# Patient Record
Sex: FEMALE | Race: WHITE | NOT HISPANIC OR LATINO | URBAN - METROPOLITAN AREA
[De-identification: names, ages, dates, MRNs, and addresses within clinical notes are randomized per-mention and may not be internally consistent; named-entity substitution may affect disease eponyms.]

---

## 2018-05-10 ENCOUNTER — OFFICE (AMBULATORY)
Dept: URBAN - METROPOLITAN AREA CLINIC 75 | Facility: CLINIC | Age: 78
End: 2018-05-10

## 2018-05-10 VITALS
WEIGHT: 156 LBS | SYSTOLIC BLOOD PRESSURE: 122 MMHG | HEIGHT: 64 IN | DIASTOLIC BLOOD PRESSURE: 74 MMHG | HEART RATE: 66 BPM

## 2018-05-10 DIAGNOSIS — K59.00 CONSTIPATION, UNSPECIFIED: ICD-10-CM

## 2018-05-10 DIAGNOSIS — K64.8 OTHER HEMORRHOIDS: ICD-10-CM

## 2018-05-10 DIAGNOSIS — K57.30 DIVERTICULOSIS OF LARGE INTESTINE WITHOUT PERFORATION OR ABS: ICD-10-CM

## 2018-05-10 DIAGNOSIS — K58.9 IRRITABLE BOWEL SYNDROME WITHOUT DIARRHEA: ICD-10-CM

## 2018-05-10 DIAGNOSIS — K22.4 DYSKINESIA OF ESOPHAGUS: ICD-10-CM

## 2018-05-10 DIAGNOSIS — R94.5 ABNORMAL RESULTS OF LIVER FUNCTION STUDIES: ICD-10-CM

## 2018-05-10 DIAGNOSIS — K29.50 UNSPECIFIED CHRONIC GASTRITIS WITHOUT BLEEDING: ICD-10-CM

## 2018-05-10 DIAGNOSIS — K44.9 DIAPHRAGMATIC HERNIA WITHOUT OBSTRUCTION OR GANGRENE: ICD-10-CM

## 2018-05-10 DIAGNOSIS — R93.3 ABNORMAL FINDINGS ON DIAGNOSTIC IMAGING OF OTHER PARTS OF DI: ICD-10-CM

## 2018-05-10 DIAGNOSIS — R13.10 DYSPHAGIA, UNSPECIFIED: ICD-10-CM

## 2018-05-10 DIAGNOSIS — K31.9 DISEASE OF STOMACH AND DUODENUM, UNSPECIFIED: ICD-10-CM

## 2018-05-10 DIAGNOSIS — K30 FUNCTIONAL DYSPEPSIA: ICD-10-CM

## 2018-05-10 PROCEDURE — 99214 OFFICE O/P EST MOD 30 MIN: CPT | Performed by: INTERNAL MEDICINE

## 2018-05-10 NOTE — SERVICEHPINOTES
Ms. Carlos is here for followup. She still has issues with esophageal dysmotility, that's related to her hiatal hernia and as a result she has some compression of the esophagus. She does not have a mechanical obstruction or stricture. Check she says from a reflux standpoint she's been doing well, she has occasional situational reflux but no chronic issues. She does tell when she lies down she'll sometimes have belching. She knows to he eats small meals she knows not to eat late. She was wanted to know what she could use as needed for upper GI symptoms and suggested Gaviscon. If she is having chronic symptoms she would benefit from a PPI or H2 blocker but she does not have frequent symptoms.She does have a she's changed her diet and thanks for that reason she is lost about 5-7 pounds. She's had a slow weight loss and again it seems to be more or less diarrhea related. However she does tell me she's been having nausea. The nausea is only been an issue for about 3 weeks. She'll be queasy and nauseous, there is no emesis. Recent labs have been unremarkable. She says she's had some decrease in appetite from the nausea. I told her Gaviscon may also help the nausea symptoms.She is also complaining of constipation. She'll go to the bathroom but is only small amounts of what she describes as like raisins. If she takes a stool softener stools will then be loose. We talked about try MiraLax and she has actually used a low dose of MiraLax last few days and says it seems to be helping. She also wanted about a glycerin suppository and she can use that when necessary. We also talked about probiotic. She took a probiotic but only for about a week and thought it made her feel worse. She does have a history of polyps. She is up-to-date in terms of colonoscopy, did colonoscopy on her in August 2016 per also do an upper endoscopy on her at that time. She is in no distress. She does not look acutely ill. Otherwise there is no change in her past medical pressors will history Italo does not smoke or drink.

## 2018-11-12 ENCOUNTER — HOSPITAL ENCOUNTER (OUTPATIENT)
Dept: OTHER | Facility: HOSPITAL | Age: 78
Setting detail: SPECIMEN
Discharge: HOME | End: 2018-11-12
Attending: UROLOGY | Admitting: UROLOGY

## 2018-11-13 LAB — SPECIMEN SOURCE: NORMAL

## 2019-02-05 ENCOUNTER — OFFICE (AMBULATORY)
Dept: URBAN - METROPOLITAN AREA CLINIC 75 | Facility: CLINIC | Age: 79
End: 2019-02-05

## 2019-02-05 VITALS
HEIGHT: 64 IN | SYSTOLIC BLOOD PRESSURE: 134 MMHG | DIASTOLIC BLOOD PRESSURE: 70 MMHG | WEIGHT: 138 LBS | HEART RATE: 72 BPM

## 2019-02-05 DIAGNOSIS — K21.9 GASTRO-ESOPHAGEAL REFLUX DISEASE WITHOUT ESOPHAGITIS: ICD-10-CM

## 2019-02-05 DIAGNOSIS — K59.00 CONSTIPATION, UNSPECIFIED: ICD-10-CM

## 2019-02-05 DIAGNOSIS — R13.10 DYSPHAGIA, UNSPECIFIED: ICD-10-CM

## 2019-02-05 DIAGNOSIS — K58.1 IRRITABLE BOWEL SYNDROME WITH CONSTIPATION: ICD-10-CM

## 2019-02-05 DIAGNOSIS — K57.30 DIVERTICULOSIS OF LARGE INTESTINE WITHOUT PERFORATION OR ABS: ICD-10-CM

## 2019-02-05 DIAGNOSIS — K31.9 DISEASE OF STOMACH AND DUODENUM, UNSPECIFIED: ICD-10-CM

## 2019-02-05 DIAGNOSIS — K22.2 ESOPHAGEAL OBSTRUCTION: ICD-10-CM

## 2019-02-05 DIAGNOSIS — R11.0 NAUSEA: ICD-10-CM

## 2019-02-05 DIAGNOSIS — K44.9 DIAPHRAGMATIC HERNIA WITHOUT OBSTRUCTION OR GANGRENE: ICD-10-CM

## 2019-02-05 DIAGNOSIS — K64.8 OTHER HEMORRHOIDS: ICD-10-CM

## 2019-02-05 DIAGNOSIS — K22.4 DYSKINESIA OF ESOPHAGUS: ICD-10-CM

## 2019-02-05 DIAGNOSIS — Z86.010 PERSONAL HISTORY OF COLONIC POLYPS: ICD-10-CM

## 2019-02-05 PROCEDURE — 99213 OFFICE O/P EST LOW 20 MIN: CPT | Performed by: NURSE PRACTITIONER

## 2021-09-30 ENCOUNTER — OFFICE (AMBULATORY)
Dept: URBAN - METROPOLITAN AREA CLINIC 75 | Facility: CLINIC | Age: 81
End: 2021-09-30

## 2021-09-30 VITALS
WEIGHT: 129 LBS | DIASTOLIC BLOOD PRESSURE: 58 MMHG | OXYGEN SATURATION: 92 % | TEMPERATURE: 98.1 F | HEIGHT: 64 IN | SYSTOLIC BLOOD PRESSURE: 124 MMHG | HEART RATE: 63 BPM

## 2021-09-30 DIAGNOSIS — K59.00 CONSTIPATION, UNSPECIFIED: ICD-10-CM

## 2021-09-30 DIAGNOSIS — K21.9 GASTRO-ESOPHAGEAL REFLUX DISEASE WITHOUT ESOPHAGITIS: ICD-10-CM

## 2021-09-30 DIAGNOSIS — Z86.010 PERSONAL HISTORY OF COLONIC POLYPS: ICD-10-CM

## 2021-09-30 DIAGNOSIS — K44.9 DIAPHRAGMATIC HERNIA WITHOUT OBSTRUCTION OR GANGRENE: ICD-10-CM

## 2021-09-30 PROCEDURE — 99214 OFFICE O/P EST MOD 30 MIN: CPT | Performed by: INTERNAL MEDICINE

## 2021-09-30 NOTE — SERVICEHPINOTES
Ms. Carlos is here for follow-up.  I have not seen her in some time.  The last time she was here she saw my nurse practitioner.  She has a history of polyps and is due for colonoscopy.  She does have problems with constipation which is not new.  She uses MiraLAX and a stool softener.  There is no bleeding.  There is no lower abdominal pain.  Family history is negative for polyps or colon cancer.
lilian Argueta also has a history of reflux.  She has a history of a large hiatal hernia and stricture and she does have trouble swallowing at times.  Since I saw her she has lost weight which she says is due to the fact she's had problems with UTIs and is treated lithotripsy and stenting for kidney stones.  She says her weight has leveled off.  She says she has a good appetite.  She does tell me that she was put on potassium supplements and which she took it at night that did cause some burning and belching.  She does not smoke or drink.  She is in no distress.  She does not look acutely ill.

## 2021-12-07 VITALS
SYSTOLIC BLOOD PRESSURE: 113 MMHG | HEART RATE: 70 BPM | SYSTOLIC BLOOD PRESSURE: 110 MMHG | TEMPERATURE: 98.1 F | HEIGHT: 64 IN | DIASTOLIC BLOOD PRESSURE: 57 MMHG | HEART RATE: 67 BPM | RESPIRATION RATE: 16 BRPM | TEMPERATURE: 97.3 F | OXYGEN SATURATION: 100 % | SYSTOLIC BLOOD PRESSURE: 126 MMHG | DIASTOLIC BLOOD PRESSURE: 71 MMHG | SYSTOLIC BLOOD PRESSURE: 144 MMHG | DIASTOLIC BLOOD PRESSURE: 41 MMHG | SYSTOLIC BLOOD PRESSURE: 141 MMHG | HEART RATE: 65 BPM | DIASTOLIC BLOOD PRESSURE: 47 MMHG | DIASTOLIC BLOOD PRESSURE: 58 MMHG | OXYGEN SATURATION: 96 % | HEART RATE: 72 BPM | SYSTOLIC BLOOD PRESSURE: 112 MMHG | RESPIRATION RATE: 17 BRPM | SYSTOLIC BLOOD PRESSURE: 107 MMHG | RESPIRATION RATE: 14 BRPM | OXYGEN SATURATION: 98 % | DIASTOLIC BLOOD PRESSURE: 66 MMHG | DIASTOLIC BLOOD PRESSURE: 63 MMHG | DIASTOLIC BLOOD PRESSURE: 52 MMHG | SYSTOLIC BLOOD PRESSURE: 132 MMHG | RESPIRATION RATE: 12 BRPM | SYSTOLIC BLOOD PRESSURE: 162 MMHG | HEART RATE: 64 BPM | OXYGEN SATURATION: 99 % | HEART RATE: 63 BPM | SYSTOLIC BLOOD PRESSURE: 131 MMHG | SYSTOLIC BLOOD PRESSURE: 153 MMHG | WEIGHT: 130 LBS | RESPIRATION RATE: 13 BRPM | DIASTOLIC BLOOD PRESSURE: 64 MMHG | HEART RATE: 66 BPM | DIASTOLIC BLOOD PRESSURE: 68 MMHG | SYSTOLIC BLOOD PRESSURE: 101 MMHG | HEART RATE: 68 BPM | DIASTOLIC BLOOD PRESSURE: 44 MMHG | RESPIRATION RATE: 18 BRPM

## 2021-12-08 ENCOUNTER — OFFICE (AMBULATORY)
Dept: URBAN - METROPOLITAN AREA PATHOLOGY 4 | Facility: PATHOLOGY | Age: 81
End: 2021-12-08

## 2021-12-08 ENCOUNTER — AMBULATORY SURGICAL CENTER (AMBULATORY)
Dept: URBAN - METROPOLITAN AREA SURGERY 17 | Facility: SURGERY | Age: 81
End: 2021-12-08
Payer: MEDICARE

## 2021-12-08 DIAGNOSIS — Z86.010 PERSONAL HISTORY OF COLONIC POLYPS: ICD-10-CM

## 2021-12-08 DIAGNOSIS — K22.2 ESOPHAGEAL OBSTRUCTION: ICD-10-CM

## 2021-12-08 DIAGNOSIS — B96.81 HELICOBACTER PYLORI [H. PYLORI] AS THE CAUSE OF DISEASES CLA: ICD-10-CM

## 2021-12-08 DIAGNOSIS — K57.30 DIVERTICULOSIS OF LARGE INTESTINE WITHOUT PERFORATION OR ABS: ICD-10-CM

## 2021-12-08 DIAGNOSIS — K44.9 DIAPHRAGMATIC HERNIA WITHOUT OBSTRUCTION OR GANGRENE: ICD-10-CM

## 2021-12-08 DIAGNOSIS — R13.10 DYSPHAGIA, UNSPECIFIED: ICD-10-CM

## 2021-12-08 DIAGNOSIS — K29.60 OTHER GASTRITIS WITHOUT BLEEDING: ICD-10-CM

## 2021-12-08 DIAGNOSIS — R63.4 ABNORMAL WEIGHT LOSS: ICD-10-CM

## 2021-12-08 DIAGNOSIS — K31.89 OTHER DISEASES OF STOMACH AND DUODENUM: ICD-10-CM

## 2021-12-08 LAB
GI HISTOLOGY: A. UNSPECIFIED: (no result)
GI HISTOLOGY: PDF REPORT: (no result)

## 2021-12-08 PROCEDURE — 88305 TISSUE EXAM BY PATHOLOGIST: CPT | Performed by: INTERNAL MEDICINE

## 2021-12-08 PROCEDURE — 43249 ESOPH EGD DILATION <30 MM: CPT | Performed by: INTERNAL MEDICINE

## 2021-12-08 PROCEDURE — 43239 EGD BIOPSY SINGLE/MULTIPLE: CPT | Mod: 59 | Performed by: INTERNAL MEDICINE

## 2021-12-08 PROCEDURE — G0105 COLORECTAL SCRN; HI RISK IND: HCPCS | Performed by: INTERNAL MEDICINE

## 2021-12-08 PROCEDURE — 88342 IMHCHEM/IMCYTCHM 1ST ANTB: CPT | Performed by: INTERNAL MEDICINE

## 2021-12-08 NOTE — SERVICEHPINOTES
Ms. Carlos is here for follow-up.  I have not seen her in some time.  The last time she was here she saw my nurse practitioner.  She has a history of polyps and is due for colonoscopy.  She does have problems with constipation which is not new.  She uses MiraLAX and a stool softener.  There is no bleeding.  There is no lower abdominal pain.  Family history is negative for polyps or colon cancer.She also has a history of reflux.  She has a history of a large hiatal hernia and stricture and she does have trouble swallowing at times.  Since I saw her she has lost weight which she says is due to the fact she's had problems with UTIs and is treated lithotripsy and stenting for kidney stones.  She says her weight has leveled off.  She says she has a good appetite.  She does tell me that she was put on potassium supplements and which she took it at night that did cause some burning and belching.  She does not smoke or drink.  She is in no distress.  She does not look acutely ill.

## 2022-05-13 ENCOUNTER — OFFICE (AMBULATORY)
Dept: URBAN - METROPOLITAN AREA CLINIC 75 | Facility: CLINIC | Age: 82
End: 2022-05-13

## 2022-05-13 VITALS
OXYGEN SATURATION: 94 % | HEIGHT: 64 IN | HEART RATE: 67 BPM | DIASTOLIC BLOOD PRESSURE: 70 MMHG | WEIGHT: 124 LBS | SYSTOLIC BLOOD PRESSURE: 128 MMHG

## 2022-05-13 DIAGNOSIS — K44.9 DIAPHRAGMATIC HERNIA WITHOUT OBSTRUCTION OR GANGRENE: ICD-10-CM

## 2022-05-13 DIAGNOSIS — K59.00 CONSTIPATION, UNSPECIFIED: ICD-10-CM

## 2022-05-13 DIAGNOSIS — R94.5 ABNORMAL RESULTS OF LIVER FUNCTION STUDIES: ICD-10-CM

## 2022-05-13 DIAGNOSIS — K29.50 UNSPECIFIED CHRONIC GASTRITIS WITHOUT BLEEDING: ICD-10-CM

## 2022-05-13 DIAGNOSIS — K57.30 DIVERTICULOSIS OF LARGE INTESTINE WITHOUT PERFORATION OR ABS: ICD-10-CM

## 2022-05-13 DIAGNOSIS — K31.9 DISEASE OF STOMACH AND DUODENUM, UNSPECIFIED: ICD-10-CM

## 2022-05-13 DIAGNOSIS — R93.3 ABNORMAL FINDINGS ON DIAGNOSTIC IMAGING OF OTHER PARTS OF DI: ICD-10-CM

## 2022-05-13 DIAGNOSIS — R63.4 ABNORMAL WEIGHT LOSS: ICD-10-CM

## 2022-05-13 DIAGNOSIS — R13.10 DYSPHAGIA, UNSPECIFIED: ICD-10-CM

## 2022-05-13 PROCEDURE — 99213 OFFICE O/P EST LOW 20 MIN: CPT | Performed by: INTERNAL MEDICINE

## 2023-09-11 ENCOUNTER — OFFICE (AMBULATORY)
Dept: URBAN - METROPOLITAN AREA CLINIC 76 | Facility: CLINIC | Age: 83
End: 2023-09-11

## 2023-09-11 VITALS
DIASTOLIC BLOOD PRESSURE: 75 MMHG | SYSTOLIC BLOOD PRESSURE: 120 MMHG | OXYGEN SATURATION: 98 % | HEART RATE: 75 BPM | HEIGHT: 64 IN | WEIGHT: 111 LBS

## 2023-09-11 DIAGNOSIS — Z86.010 PERSONAL HISTORY OF COLONIC POLYPS: ICD-10-CM

## 2023-09-11 DIAGNOSIS — K59.00 CONSTIPATION, UNSPECIFIED: ICD-10-CM

## 2023-09-11 DIAGNOSIS — R93.3 ABNORMAL FINDINGS ON DIAGNOSTIC IMAGING OF OTHER PARTS OF DI: ICD-10-CM

## 2023-09-11 DIAGNOSIS — K21.9 GASTRO-ESOPHAGEAL REFLUX DISEASE WITHOUT ESOPHAGITIS: ICD-10-CM

## 2023-09-11 PROBLEM — K31.89 OTHER DISEASES OF STOMACH AND DUODENUM: Status: ACTIVE | Noted: 2021-12-08

## 2023-09-11 PROBLEM — K22.2 ESOPHAGEAL OBSTRUCTION: Status: ACTIVE | Noted: 2021-12-08

## 2023-09-11 PROCEDURE — 99214 OFFICE O/P EST MOD 30 MIN: CPT | Performed by: INTERNAL MEDICINE

## 2024-10-21 ENCOUNTER — OFFICE (AMBULATORY)
Age: 84
End: 2024-10-21

## 2024-10-21 ENCOUNTER — OFFICE (AMBULATORY)
Dept: URBAN - METROPOLITAN AREA CLINIC 76 | Facility: CLINIC | Age: 84
End: 2024-10-21

## 2024-10-21 VITALS
WEIGHT: 107 LBS | HEART RATE: 68 BPM | HEIGHT: 64 IN | HEART RATE: 68 BPM | HEIGHT: 64 IN | DIASTOLIC BLOOD PRESSURE: 68 MMHG | HEIGHT: 64 IN | SYSTOLIC BLOOD PRESSURE: 132 MMHG | DIASTOLIC BLOOD PRESSURE: 68 MMHG | HEART RATE: 68 BPM | HEART RATE: 68 BPM | DIASTOLIC BLOOD PRESSURE: 68 MMHG | SYSTOLIC BLOOD PRESSURE: 132 MMHG | OXYGEN SATURATION: 95 % | WEIGHT: 107 LBS | HEIGHT: 64 IN | DIASTOLIC BLOOD PRESSURE: 68 MMHG | OXYGEN SATURATION: 95 % | HEIGHT: 64 IN | DIASTOLIC BLOOD PRESSURE: 68 MMHG | SYSTOLIC BLOOD PRESSURE: 132 MMHG | WEIGHT: 107 LBS | OXYGEN SATURATION: 95 % | HEIGHT: 64 IN | SYSTOLIC BLOOD PRESSURE: 132 MMHG | DIASTOLIC BLOOD PRESSURE: 68 MMHG | WEIGHT: 107 LBS | SYSTOLIC BLOOD PRESSURE: 132 MMHG | HEART RATE: 68 BPM | SYSTOLIC BLOOD PRESSURE: 132 MMHG | WEIGHT: 107 LBS | OXYGEN SATURATION: 95 % | OXYGEN SATURATION: 95 % | WEIGHT: 107 LBS | DIASTOLIC BLOOD PRESSURE: 68 MMHG | WEIGHT: 107 LBS | HEART RATE: 68 BPM | OXYGEN SATURATION: 95 % | HEIGHT: 64 IN | HEART RATE: 68 BPM | OXYGEN SATURATION: 95 % | SYSTOLIC BLOOD PRESSURE: 132 MMHG

## 2024-10-21 DIAGNOSIS — K59.00 CONSTIPATION, UNSPECIFIED: ICD-10-CM

## 2024-10-21 DIAGNOSIS — K22.2 ESOPHAGEAL OBSTRUCTION: ICD-10-CM

## 2024-10-21 DIAGNOSIS — Z86.0100 PERSONAL HISTORY OF COLON POLYPS, UNSPECIFIED: ICD-10-CM

## 2024-10-21 DIAGNOSIS — R63.4 ABNORMAL WEIGHT LOSS: ICD-10-CM

## 2024-10-21 DIAGNOSIS — R13.10 DYSPHAGIA, UNSPECIFIED: ICD-10-CM

## 2024-10-21 PROCEDURE — 99214 OFFICE O/P EST MOD 30 MIN: CPT | Performed by: NURSE PRACTITIONER

## 2024-11-04 VITALS
DIASTOLIC BLOOD PRESSURE: 70 MMHG | DIASTOLIC BLOOD PRESSURE: 74 MMHG | RESPIRATION RATE: 15 BRPM | RESPIRATION RATE: 15 BRPM | HEART RATE: 66 BPM | RESPIRATION RATE: 13 BRPM | DIASTOLIC BLOOD PRESSURE: 61 MMHG | HEART RATE: 90 BPM | HEART RATE: 90 BPM | RESPIRATION RATE: 12 BRPM | DIASTOLIC BLOOD PRESSURE: 65 MMHG | RESPIRATION RATE: 12 BRPM | OXYGEN SATURATION: 89 % | SYSTOLIC BLOOD PRESSURE: 125 MMHG | SYSTOLIC BLOOD PRESSURE: 108 MMHG | DIASTOLIC BLOOD PRESSURE: 74 MMHG | OXYGEN SATURATION: 99 % | OXYGEN SATURATION: 97 % | SYSTOLIC BLOOD PRESSURE: 164 MMHG | OXYGEN SATURATION: 89 % | SYSTOLIC BLOOD PRESSURE: 125 MMHG | HEART RATE: 76 BPM | HEART RATE: 66 BPM | SYSTOLIC BLOOD PRESSURE: 139 MMHG | DIASTOLIC BLOOD PRESSURE: 70 MMHG | DIASTOLIC BLOOD PRESSURE: 51 MMHG | SYSTOLIC BLOOD PRESSURE: 129 MMHG | SYSTOLIC BLOOD PRESSURE: 164 MMHG | DIASTOLIC BLOOD PRESSURE: 51 MMHG | RESPIRATION RATE: 10 BRPM | SYSTOLIC BLOOD PRESSURE: 139 MMHG | DIASTOLIC BLOOD PRESSURE: 57 MMHG | DIASTOLIC BLOOD PRESSURE: 74 MMHG | HEART RATE: 76 BPM | OXYGEN SATURATION: 100 % | SYSTOLIC BLOOD PRESSURE: 121 MMHG | RESPIRATION RATE: 12 BRPM | DIASTOLIC BLOOD PRESSURE: 48 MMHG | SYSTOLIC BLOOD PRESSURE: 128 MMHG | SYSTOLIC BLOOD PRESSURE: 151 MMHG | SYSTOLIC BLOOD PRESSURE: 131 MMHG | OXYGEN SATURATION: 100 % | HEART RATE: 63 BPM | RESPIRATION RATE: 18 BRPM | HEART RATE: 79 BPM | SYSTOLIC BLOOD PRESSURE: 151 MMHG | HEART RATE: 71 BPM | DIASTOLIC BLOOD PRESSURE: 65 MMHG | DIASTOLIC BLOOD PRESSURE: 48 MMHG | HEART RATE: 78 BPM | DIASTOLIC BLOOD PRESSURE: 74 MMHG | WEIGHT: 105 LBS | OXYGEN SATURATION: 97 % | SYSTOLIC BLOOD PRESSURE: 101 MMHG | DIASTOLIC BLOOD PRESSURE: 75 MMHG | SYSTOLIC BLOOD PRESSURE: 139 MMHG | HEART RATE: 88 BPM | HEART RATE: 81 BPM | RESPIRATION RATE: 20 BRPM | OXYGEN SATURATION: 89 % | HEART RATE: 88 BPM | DIASTOLIC BLOOD PRESSURE: 51 MMHG | SYSTOLIC BLOOD PRESSURE: 128 MMHG | SYSTOLIC BLOOD PRESSURE: 126 MMHG | RESPIRATION RATE: 13 BRPM | SYSTOLIC BLOOD PRESSURE: 108 MMHG | RESPIRATION RATE: 13 BRPM | HEART RATE: 85 BPM | HEART RATE: 90 BPM | DIASTOLIC BLOOD PRESSURE: 47 MMHG | HEIGHT: 64 IN | SYSTOLIC BLOOD PRESSURE: 128 MMHG | RESPIRATION RATE: 9 BRPM | SYSTOLIC BLOOD PRESSURE: 129 MMHG | RESPIRATION RATE: 20 BRPM | DIASTOLIC BLOOD PRESSURE: 51 MMHG | HEART RATE: 65 BPM | DIASTOLIC BLOOD PRESSURE: 61 MMHG | HEART RATE: 65 BPM | SYSTOLIC BLOOD PRESSURE: 116 MMHG | SYSTOLIC BLOOD PRESSURE: 156 MMHG | OXYGEN SATURATION: 100 % | RESPIRATION RATE: 20 BRPM | HEART RATE: 78 BPM | DIASTOLIC BLOOD PRESSURE: 47 MMHG | HEART RATE: 88 BPM | HEART RATE: 90 BPM | SYSTOLIC BLOOD PRESSURE: 116 MMHG | HEART RATE: 76 BPM | RESPIRATION RATE: 9 BRPM | DIASTOLIC BLOOD PRESSURE: 57 MMHG | DIASTOLIC BLOOD PRESSURE: 70 MMHG | RESPIRATION RATE: 10 BRPM | SYSTOLIC BLOOD PRESSURE: 125 MMHG | RESPIRATION RATE: 15 BRPM | HEART RATE: 78 BPM | HEART RATE: 66 BPM | HEIGHT: 64 IN | RESPIRATION RATE: 10 BRPM | DIASTOLIC BLOOD PRESSURE: 48 MMHG | DIASTOLIC BLOOD PRESSURE: 47 MMHG | HEART RATE: 88 BPM | DIASTOLIC BLOOD PRESSURE: 75 MMHG | OXYGEN SATURATION: 89 % | HEART RATE: 79 BPM | DIASTOLIC BLOOD PRESSURE: 61 MMHG | HEIGHT: 64 IN | HEART RATE: 85 BPM | HEART RATE: 71 BPM | OXYGEN SATURATION: 99 % | DIASTOLIC BLOOD PRESSURE: 75 MMHG | OXYGEN SATURATION: 97 % | HEART RATE: 90 BPM | HEART RATE: 90 BPM | DIASTOLIC BLOOD PRESSURE: 75 MMHG | SYSTOLIC BLOOD PRESSURE: 131 MMHG | SYSTOLIC BLOOD PRESSURE: 101 MMHG | HEART RATE: 85 BPM | RESPIRATION RATE: 12 BRPM | DIASTOLIC BLOOD PRESSURE: 74 MMHG | DIASTOLIC BLOOD PRESSURE: 70 MMHG | HEART RATE: 81 BPM | WEIGHT: 105 LBS | RESPIRATION RATE: 18 BRPM | HEART RATE: 88 BPM | TEMPERATURE: 97.1 F | RESPIRATION RATE: 13 BRPM | RESPIRATION RATE: 9 BRPM | DIASTOLIC BLOOD PRESSURE: 57 MMHG | DIASTOLIC BLOOD PRESSURE: 56 MMHG | HEART RATE: 68 BPM | HEART RATE: 71 BPM | RESPIRATION RATE: 18 BRPM | OXYGEN SATURATION: 99 % | HEART RATE: 71 BPM | HEART RATE: 79 BPM | SYSTOLIC BLOOD PRESSURE: 131 MMHG | TEMPERATURE: 97.7 F | DIASTOLIC BLOOD PRESSURE: 56 MMHG | SYSTOLIC BLOOD PRESSURE: 156 MMHG | SYSTOLIC BLOOD PRESSURE: 151 MMHG | RESPIRATION RATE: 10 BRPM | OXYGEN SATURATION: 100 % | RESPIRATION RATE: 18 BRPM | TEMPERATURE: 97.7 F | SYSTOLIC BLOOD PRESSURE: 151 MMHG | HEART RATE: 85 BPM | WEIGHT: 105 LBS | RESPIRATION RATE: 20 BRPM | HEART RATE: 79 BPM | SYSTOLIC BLOOD PRESSURE: 108 MMHG | RESPIRATION RATE: 18 BRPM | SYSTOLIC BLOOD PRESSURE: 128 MMHG | DIASTOLIC BLOOD PRESSURE: 47 MMHG | HEART RATE: 63 BPM | SYSTOLIC BLOOD PRESSURE: 164 MMHG | SYSTOLIC BLOOD PRESSURE: 164 MMHG | HEART RATE: 71 BPM | WEIGHT: 105 LBS | HEART RATE: 63 BPM | OXYGEN SATURATION: 99 % | SYSTOLIC BLOOD PRESSURE: 101 MMHG | RESPIRATION RATE: 18 BRPM | SYSTOLIC BLOOD PRESSURE: 129 MMHG | DIASTOLIC BLOOD PRESSURE: 80 MMHG | SYSTOLIC BLOOD PRESSURE: 125 MMHG | HEART RATE: 66 BPM | HEART RATE: 85 BPM | DIASTOLIC BLOOD PRESSURE: 57 MMHG | DIASTOLIC BLOOD PRESSURE: 48 MMHG | HEART RATE: 79 BPM | HEART RATE: 71 BPM | DIASTOLIC BLOOD PRESSURE: 56 MMHG | DIASTOLIC BLOOD PRESSURE: 74 MMHG | SYSTOLIC BLOOD PRESSURE: 131 MMHG | SYSTOLIC BLOOD PRESSURE: 164 MMHG | HEART RATE: 65 BPM | SYSTOLIC BLOOD PRESSURE: 108 MMHG | HEART RATE: 76 BPM | OXYGEN SATURATION: 98 % | SYSTOLIC BLOOD PRESSURE: 129 MMHG | TEMPERATURE: 97.7 F | HEIGHT: 64 IN | SYSTOLIC BLOOD PRESSURE: 126 MMHG | OXYGEN SATURATION: 98 % | DIASTOLIC BLOOD PRESSURE: 61 MMHG | HEART RATE: 76 BPM | HEART RATE: 81 BPM | TEMPERATURE: 97.7 F | RESPIRATION RATE: 15 BRPM | RESPIRATION RATE: 15 BRPM | SYSTOLIC BLOOD PRESSURE: 101 MMHG | SYSTOLIC BLOOD PRESSURE: 156 MMHG | RESPIRATION RATE: 13 BRPM | RESPIRATION RATE: 9 BRPM | HEART RATE: 66 BPM | TEMPERATURE: 97.1 F | DIASTOLIC BLOOD PRESSURE: 74 MMHG | RESPIRATION RATE: 13 BRPM | HEART RATE: 90 BPM | HEART RATE: 81 BPM | RESPIRATION RATE: 20 BRPM | SYSTOLIC BLOOD PRESSURE: 131 MMHG | SYSTOLIC BLOOD PRESSURE: 125 MMHG | SYSTOLIC BLOOD PRESSURE: 126 MMHG | RESPIRATION RATE: 20 BRPM | DIASTOLIC BLOOD PRESSURE: 80 MMHG | SYSTOLIC BLOOD PRESSURE: 116 MMHG | TEMPERATURE: 97.7 F | RESPIRATION RATE: 13 BRPM | DIASTOLIC BLOOD PRESSURE: 80 MMHG | SYSTOLIC BLOOD PRESSURE: 128 MMHG | SYSTOLIC BLOOD PRESSURE: 139 MMHG | DIASTOLIC BLOOD PRESSURE: 47 MMHG | OXYGEN SATURATION: 89 % | TEMPERATURE: 97.7 F | OXYGEN SATURATION: 89 % | HEART RATE: 78 BPM | SYSTOLIC BLOOD PRESSURE: 101 MMHG | DIASTOLIC BLOOD PRESSURE: 80 MMHG | SYSTOLIC BLOOD PRESSURE: 126 MMHG | DIASTOLIC BLOOD PRESSURE: 51 MMHG | DIASTOLIC BLOOD PRESSURE: 80 MMHG | HEART RATE: 79 BPM | DIASTOLIC BLOOD PRESSURE: 70 MMHG | OXYGEN SATURATION: 97 % | DIASTOLIC BLOOD PRESSURE: 65 MMHG | OXYGEN SATURATION: 99 % | OXYGEN SATURATION: 100 % | DIASTOLIC BLOOD PRESSURE: 56 MMHG | SYSTOLIC BLOOD PRESSURE: 101 MMHG | OXYGEN SATURATION: 100 % | SYSTOLIC BLOOD PRESSURE: 151 MMHG | SYSTOLIC BLOOD PRESSURE: 121 MMHG | DIASTOLIC BLOOD PRESSURE: 57 MMHG | HEART RATE: 63 BPM | HEART RATE: 81 BPM | TEMPERATURE: 97.1 F | RESPIRATION RATE: 12 BRPM | SYSTOLIC BLOOD PRESSURE: 125 MMHG | DIASTOLIC BLOOD PRESSURE: 75 MMHG | SYSTOLIC BLOOD PRESSURE: 126 MMHG | TEMPERATURE: 97.1 F | SYSTOLIC BLOOD PRESSURE: 116 MMHG | HEART RATE: 68 BPM | SYSTOLIC BLOOD PRESSURE: 139 MMHG | DIASTOLIC BLOOD PRESSURE: 56 MMHG | SYSTOLIC BLOOD PRESSURE: 131 MMHG | HEART RATE: 78 BPM | DIASTOLIC BLOOD PRESSURE: 65 MMHG | DIASTOLIC BLOOD PRESSURE: 75 MMHG | SYSTOLIC BLOOD PRESSURE: 116 MMHG | HEART RATE: 66 BPM | OXYGEN SATURATION: 99 % | SYSTOLIC BLOOD PRESSURE: 129 MMHG | RESPIRATION RATE: 15 BRPM | DIASTOLIC BLOOD PRESSURE: 56 MMHG | DIASTOLIC BLOOD PRESSURE: 65 MMHG | SYSTOLIC BLOOD PRESSURE: 128 MMHG | HEART RATE: 78 BPM | SYSTOLIC BLOOD PRESSURE: 139 MMHG | HEART RATE: 85 BPM | HEART RATE: 88 BPM | SYSTOLIC BLOOD PRESSURE: 126 MMHG | WEIGHT: 105 LBS | SYSTOLIC BLOOD PRESSURE: 151 MMHG | OXYGEN SATURATION: 98 % | HEART RATE: 68 BPM | SYSTOLIC BLOOD PRESSURE: 121 MMHG | WEIGHT: 105 LBS | TEMPERATURE: 97.7 F | SYSTOLIC BLOOD PRESSURE: 121 MMHG | RESPIRATION RATE: 12 BRPM | SYSTOLIC BLOOD PRESSURE: 116 MMHG | RESPIRATION RATE: 15 BRPM | DIASTOLIC BLOOD PRESSURE: 48 MMHG | SYSTOLIC BLOOD PRESSURE: 164 MMHG | DIASTOLIC BLOOD PRESSURE: 61 MMHG | SYSTOLIC BLOOD PRESSURE: 156 MMHG | OXYGEN SATURATION: 97 % | RESPIRATION RATE: 9 BRPM | DIASTOLIC BLOOD PRESSURE: 48 MMHG | DIASTOLIC BLOOD PRESSURE: 61 MMHG | DIASTOLIC BLOOD PRESSURE: 61 MMHG | SYSTOLIC BLOOD PRESSURE: 125 MMHG | DIASTOLIC BLOOD PRESSURE: 47 MMHG | DIASTOLIC BLOOD PRESSURE: 65 MMHG | DIASTOLIC BLOOD PRESSURE: 57 MMHG | HEART RATE: 68 BPM | DIASTOLIC BLOOD PRESSURE: 70 MMHG | DIASTOLIC BLOOD PRESSURE: 47 MMHG | HEIGHT: 64 IN | OXYGEN SATURATION: 98 % | HEART RATE: 65 BPM | SYSTOLIC BLOOD PRESSURE: 101 MMHG | SYSTOLIC BLOOD PRESSURE: 164 MMHG | SYSTOLIC BLOOD PRESSURE: 108 MMHG | OXYGEN SATURATION: 98 % | RESPIRATION RATE: 10 BRPM | DIASTOLIC BLOOD PRESSURE: 70 MMHG | TEMPERATURE: 97.1 F | SYSTOLIC BLOOD PRESSURE: 121 MMHG | SYSTOLIC BLOOD PRESSURE: 129 MMHG | HEART RATE: 63 BPM | DIASTOLIC BLOOD PRESSURE: 65 MMHG | DIASTOLIC BLOOD PRESSURE: 48 MMHG | DIASTOLIC BLOOD PRESSURE: 57 MMHG | SYSTOLIC BLOOD PRESSURE: 121 MMHG | RESPIRATION RATE: 12 BRPM | HEART RATE: 76 BPM | HEART RATE: 88 BPM | SYSTOLIC BLOOD PRESSURE: 108 MMHG | SYSTOLIC BLOOD PRESSURE: 126 MMHG | OXYGEN SATURATION: 98 % | HEART RATE: 65 BPM | HEART RATE: 81 BPM | TEMPERATURE: 97.1 F | HEART RATE: 68 BPM | HEART RATE: 63 BPM | HEART RATE: 85 BPM | DIASTOLIC BLOOD PRESSURE: 80 MMHG | RESPIRATION RATE: 18 BRPM | OXYGEN SATURATION: 98 % | OXYGEN SATURATION: 89 % | HEART RATE: 76 BPM | HEART RATE: 68 BPM | SYSTOLIC BLOOD PRESSURE: 116 MMHG | OXYGEN SATURATION: 99 % | HEART RATE: 71 BPM | TEMPERATURE: 97.1 F | SYSTOLIC BLOOD PRESSURE: 108 MMHG | DIASTOLIC BLOOD PRESSURE: 80 MMHG | OXYGEN SATURATION: 97 % | HEIGHT: 64 IN | SYSTOLIC BLOOD PRESSURE: 151 MMHG | SYSTOLIC BLOOD PRESSURE: 121 MMHG | RESPIRATION RATE: 9 BRPM | HEART RATE: 66 BPM | SYSTOLIC BLOOD PRESSURE: 131 MMHG | RESPIRATION RATE: 20 BRPM | RESPIRATION RATE: 10 BRPM | HEART RATE: 65 BPM | SYSTOLIC BLOOD PRESSURE: 156 MMHG | RESPIRATION RATE: 10 BRPM | HEART RATE: 65 BPM | SYSTOLIC BLOOD PRESSURE: 156 MMHG | HEART RATE: 79 BPM | SYSTOLIC BLOOD PRESSURE: 156 MMHG | DIASTOLIC BLOOD PRESSURE: 51 MMHG | SYSTOLIC BLOOD PRESSURE: 129 MMHG | WEIGHT: 105 LBS | SYSTOLIC BLOOD PRESSURE: 128 MMHG | OXYGEN SATURATION: 97 % | SYSTOLIC BLOOD PRESSURE: 139 MMHG | RESPIRATION RATE: 9 BRPM | HEART RATE: 78 BPM | DIASTOLIC BLOOD PRESSURE: 56 MMHG | HEART RATE: 68 BPM | HEART RATE: 81 BPM | DIASTOLIC BLOOD PRESSURE: 75 MMHG | OXYGEN SATURATION: 100 % | HEIGHT: 64 IN | HEART RATE: 63 BPM | DIASTOLIC BLOOD PRESSURE: 51 MMHG

## 2024-11-07 ENCOUNTER — AMBULATORY SURGICAL CENTER (AMBULATORY)
Dept: URBAN - METROPOLITAN AREA SURGERY 17 | Facility: SURGERY | Age: 84
End: 2024-11-07
Payer: MEDICARE

## 2024-11-07 ENCOUNTER — AMBULATORY SURGICAL CENTER (AMBULATORY)
Age: 84
End: 2024-11-07
Payer: MEDICARE

## 2024-11-07 DIAGNOSIS — K22.4 DYSKINESIA OF ESOPHAGUS: ICD-10-CM

## 2024-11-07 DIAGNOSIS — K57.30 DIVERTICULOSIS OF LARGE INTESTINE WITHOUT PERFORATION OR ABS: ICD-10-CM

## 2024-11-07 DIAGNOSIS — K22.89 OTHER SPECIFIED DISEASE OF ESOPHAGUS: ICD-10-CM

## 2024-11-07 DIAGNOSIS — K44.9 DIAPHRAGMATIC HERNIA WITHOUT OBSTRUCTION OR GANGRENE: ICD-10-CM

## 2024-11-07 DIAGNOSIS — K22.2 ESOPHAGEAL OBSTRUCTION: ICD-10-CM

## 2024-11-07 DIAGNOSIS — K29.70 GASTRITIS, UNSPECIFIED, WITHOUT BLEEDING: ICD-10-CM

## 2024-11-07 DIAGNOSIS — Z09 ENCOUNTER FOR FOLLOW-UP EXAMINATION AFTER COMPLETED TREATMEN: ICD-10-CM

## 2024-11-07 DIAGNOSIS — Z86.0100 PERSONAL HISTORY OF COLON POLYPS, UNSPECIFIED: ICD-10-CM

## 2024-11-07 PROCEDURE — 43249 ESOPH EGD DILATION <30 MM: CPT | Performed by: INTERNAL MEDICINE

## 2024-11-07 PROCEDURE — G0105 COLORECTAL SCRN; HI RISK IND: HCPCS | Performed by: INTERNAL MEDICINE

## 2024-11-07 PROCEDURE — 45378 DIAGNOSTIC COLONOSCOPY: CPT | Performed by: INTERNAL MEDICINE

## 2024-11-07 NOTE — SERVICEHPINOTES
84yoF here today due to dysphagia, weight loss and C. difficile.This is the first time I am seeing her.She was last seen in the office by Dr. Bernardo 1 year ago. At that time she was complaining of some regurgitation and globus symptoms and occasional trouble initiating a swallow. She was apparently evaluated by ENT who suggested a trial of Flonase but she declined. Dr. Bernardo tried to start her on a prescription PPI however again she declined and wanted to try something over-the-counter. He also felt she likely had pelvic floor dysfunction.She was seen in hospital in June. She had diarrhea at that time. C diff PCR+, toxin negative however since she was having diarrhea to the point of SHIVA, they treated her with vanc x 10 days.She reports today that she has "so many issues that need to be fixed." All sxs are very vague. She c/o liquid that is in her throat after eating, globus sensation. No burning. No emesis or nausea. Some difficulty swallowing where food gets lodged on right side of throat/esophagus and she has to get it up. She wants an EGD. Not on reflux meds because she prefers no meds yet she is on numerous supplements. She went to a new PCP at [facility] who thinks she has yeast overgrowth, She is now on supplements of l-glutamie and aloe vera. Only 1.5 weeks so unsure if it has helped. She also thinks she has had a lot of weight loss over the year. Per our scale she is only down 4 pounds in 1 year however she claims that she has lost an abundant amount of weight. She tells me she has seen multiple providers that are concerned about and want her to get scopes. She tells me that the urogyn told her to get a colonoscopy. She thought she was having rectal bleeding although patient denies this. She has having a biopsy of her uterus in a couple of weeks. She also said that urologist recommended scopes due to the weight loss.For constipation she is currently taking senna S which is working to control her bowels. She does complain of a lot of gas and bloating.

## 2024-11-07 NOTE — SERVICEHPINOTES
84yoF here today due to dysphagia, weight loss and C. difficile.This is the first time I am seeing her.She was last seen in the office by Dr. Henson 1 year ago. At that time she was complaining of some regurgitation and globus symptoms and occasional trouble initiating a swallow. She was apparently evaluated by ENT who suggested a trial of Flonase but she declined. Dr. Henson tried to start her on a prescription PPI however again she declined and wanted to try something over-the-counter. He also felt she likely had pelvic floor dysfunction.She was seen in hospital in June. She had diarrhea at that time. C diff PCR+, toxin negative however since she was having diarrhea to the point of LOYDA, they treated her with vanc x 10 days.She reports today that she has "so many issues that need to be fixed." All sxs are very vague. She c/o liquid that is in her throat after eating, globus sensation. No burning. No emesis or nausea. Some difficulty swallowing where food gets lodged on right side of throat/esophagus and she has to get it up. She wants an EGD. Not on reflux meds because she prefers no meds yet she is on numerous supplements. She went to a new PCP at [facility] who thinks she has yeast overgrowth, She is now on supplements of l-glutamie and aloe vera. Only 1.5 weeks so unsure if it has helped. She also thinks she has had a lot of weight loss over the year. Per our scale she is only down 4 pounds in 1 year however she claims that she has lost an abundant amount of weight. She tells me she has seen multiple providers that are concerned about and want her to get scopes. She tells me that the urogyn told her to get a colonoscopy. She thought she was having rectal bleeding although patient denies this. She has having a biopsy of her uterus in a couple of weeks. She also said that urologist recommended scopes due to the weight loss.For constipation she is currently taking senna S which is working to control her bowels. She does complain of a lot of gas and bloating.

## 2024-11-07 NOTE — SERVICEHPINOTES
84yoF here today due to dysphagia, weight loss and C. difficile.This is the first time I am seeing her.She was last seen in the office by Dr. Serna 1 year ago. At that time she was complaining of some regurgitation and globus symptoms and occasional trouble initiating a swallow. She was apparently evaluated by ENT who suggested a trial of Flonase but she declined. Dr. Serna tried to start her on a prescription PPI however again she declined and wanted to try something over-the-counter. He also felt she likely had pelvic floor dysfunction.She was seen in hospital in June. She had diarrhea at that time. C diff PCR+, toxin negative however since she was having diarrhea to the point of YISSEL, they treated her with vanc x 10 days.She reports today that she has "so many issues that need to be fixed." All sxs are very vague. She c/o liquid that is in her throat after eating, globus sensation. No burning. No emesis or nausea. Some difficulty swallowing where food gets lodged on right side of throat/esophagus and she has to get it up. She wants an EGD. Not on reflux meds because she prefers no meds yet she is on numerous supplements. She went to a new PCP at [facility] who thinks she has yeast overgrowth, She is now on supplements of l-glutamie and aloe vera. Only 1.5 weeks so unsure if it has helped. She also thinks she has had a lot of weight loss over the year. Per our scale she is only down 4 pounds in 1 year however she claims that she has lost an abundant amount of weight. She tells me she has seen multiple providers that are concerned about and want her to get scopes. She tells me that the urogyn told her to get a colonoscopy. She thought she was having rectal bleeding although patient denies this. She has having a biopsy of her uterus in a couple of weeks. She also said that urologist recommended scopes due to the weight loss.For constipation she is currently taking senna S which is working to control her bowels. She does complain of a lot of gas and bloating.

## 2024-11-07 NOTE — SERVICEHPINOTES
84yoF here today due to dysphagia, weight loss and C. difficile.This is the first time I am seeing her.She was last seen in the office by Dr. Arevalo 1 year ago. At that time she was complaining of some regurgitation and globus symptoms and occasional trouble initiating a swallow. She was apparently evaluated by ENT who suggested a trial of Flonase but she declined. Dr. Arevalo tried to start her on a prescription PPI however again she declined and wanted to try something over-the-counter. He also felt she likely had pelvic floor dysfunction.She was seen in hospital in June. She had diarrhea at that time. C diff PCR+, toxin negative however since she was having diarrhea to the point of YOLANDA, they treated her with vanc x 10 days.She reports today that she has "so many issues that need to be fixed." All sxs are very vague. She c/o liquid that is in her throat after eating, globus sensation. No burning. No emesis or nausea. Some difficulty swallowing where food gets lodged on right side of throat/esophagus and she has to get it up. She wants an EGD. Not on reflux meds because she prefers no meds yet she is on numerous supplements. She went to a new PCP at [facility] who thinks she has yeast overgrowth, She is now on supplements of l-glutamie and aloe vera. Only 1.5 weeks so unsure if it has helped. She also thinks she has had a lot of weight loss over the year. Per our scale she is only down 4 pounds in 1 year however she claims that she has lost an abundant amount of weight. She tells me she has seen multiple providers that are concerned about and want her to get scopes. She tells me that the urogyn told her to get a colonoscopy. She thought she was having rectal bleeding although patient denies this. She has having a biopsy of her uterus in a couple of weeks. She also said that urologist recommended scopes due to the weight loss.For constipation she is currently taking senna S which is working to control her bowels. She does complain of a lot of gas and bloating.

## 2024-11-07 NOTE — SERVICEHPINOTES
84yoF here today due to dysphagia, weight loss and C. difficile.This is the first time I am seeing her.She was last seen in the office by Dr. Machado 1 year ago. At that time she was complaining of some regurgitation and globus symptoms and occasional trouble initiating a swallow. She was apparently evaluated by ENT who suggested a trial of Flonase but she declined. Dr. Machado tried to start her on a prescription PPI however again she declined and wanted to try something over-the-counter. He also felt she likely had pelvic floor dysfunction.She was seen in hospital in June. She had diarrhea at that time. C diff PCR+, toxin negative however since she was having diarrhea to the point of TERRELL, they treated her with vanc x 10 days.She reports today that she has "so many issues that need to be fixed." All sxs are very vague. She c/o liquid that is in her throat after eating, globus sensation. No burning. No emesis or nausea. Some difficulty swallowing where food gets lodged on right side of throat/esophagus and she has to get it up. She wants an EGD. Not on reflux meds because she prefers no meds yet she is on numerous supplements. She went to a new PCP at [facility] who thinks she has yeast overgrowth, She is now on supplements of l-glutamie and aloe vera. Only 1.5 weeks so unsure if it has helped. She also thinks she has had a lot of weight loss over the year. Per our scale she is only down 4 pounds in 1 year however she claims that she has lost an abundant amount of weight. She tells me she has seen multiple providers that are concerned about and want her to get scopes. She tells me that the urogyn told her to get a colonoscopy. She thought she was having rectal bleeding although patient denies this. She has having a biopsy of her uterus in a couple of weeks. She also said that urologist recommended scopes due to the weight loss.For constipation she is currently taking senna S which is working to control her bowels. She does complain of a lot of gas and bloating.

## 2024-11-07 NOTE — SERVICEHPINOTES
84yoF here today due to dysphagia, weight loss and C. difficile.This is the first time I am seeing her.She was last seen in the office by Dr. Perdue 1 year ago. At that time she was complaining of some regurgitation and globus symptoms and occasional trouble initiating a swallow. She was apparently evaluated by ENT who suggested a trial of Flonase but she declined. Dr. Perdue tried to start her on a prescription PPI however again she declined and wanted to try something over-the-counter. He also felt she likely had pelvic floor dysfunction.She was seen in hospital in June. She had diarrhea at that time. C diff PCR+, toxin negative however since she was having diarrhea to the point of FLORENCE, they treated her with vanc x 10 days.She reports today that she has "so many issues that need to be fixed." All sxs are very vague. She c/o liquid that is in her throat after eating, globus sensation. No burning. No emesis or nausea. Some difficulty swallowing where food gets lodged on right side of throat/esophagus and she has to get it up. She wants an EGD. Not on reflux meds because she prefers no meds yet she is on numerous supplements. She went to a new PCP at [facility] who thinks she has yeast overgrowth, She is now on supplements of l-glutamie and aloe vera. Only 1.5 weeks so unsure if it has helped. She also thinks she has had a lot of weight loss over the year. Per our scale she is only down 4 pounds in 1 year however she claims that she has lost an abundant amount of weight. She tells me she has seen multiple providers that are concerned about and want her to get scopes. She tells me that the urogyn told her to get a colonoscopy. She thought she was having rectal bleeding although patient denies this. She has having a biopsy of her uterus in a couple of weeks. She also said that urologist recommended scopes due to the weight loss.For constipation she is currently taking senna S which is working to control her bowels. She does complain of a lot of gas and bloating.

## 2024-11-07 NOTE — SERVICEHPINOTES
Pt is aware of the message   84yoF here today due to dysphagia, weight loss and C. difficile.This is the first time I am seeing her.She was last seen in the office by Dr. Jones 1 year ago. At that time she was complaining of some regurgitation and globus symptoms and occasional trouble initiating a swallow. She was apparently evaluated by ENT who suggested a trial of Flonase but she declined. Dr. Jones tried to start her on a prescription PPI however again she declined and wanted to try something over-the-counter. He also felt she likely had pelvic floor dysfunction.She was seen in hospital in June. She had diarrhea at that time. C diff PCR+, toxin negative however since she was having diarrhea to the point of IQRA, they treated her with vanc x 10 days.She reports today that she has "so many issues that need to be fixed." All sxs are very vague. She c/o liquid that is in her throat after eating, globus sensation. No burning. No emesis or nausea. Some difficulty swallowing where food gets lodged on right side of throat/esophagus and she has to get it up. She wants an EGD. Not on reflux meds because she prefers no meds yet she is on numerous supplements. She went to a new PCP at [facility] who thinks she has yeast overgrowth, She is now on supplements of l-glutamie and aloe vera. Only 1.5 weeks so unsure if it has helped. She also thinks she has had a lot of weight loss over the year. Per our scale she is only down 4 pounds in 1 year however she claims that she has lost an abundant amount of weight. She tells me she has seen multiple providers that are concerned about and want her to get scopes. She tells me that the urogyn told her to get a colonoscopy. She thought she was having rectal bleeding although patient denies this. She has having a biopsy of her uterus in a couple of weeks. She also said that urologist recommended scopes due to the weight loss.For constipation she is currently taking senna S which is working to control her bowels. She does complain of a lot of gas and bloating.